# Patient Record
Sex: FEMALE | Race: WHITE | URBAN - METROPOLITAN AREA
[De-identification: names, ages, dates, MRNs, and addresses within clinical notes are randomized per-mention and may not be internally consistent; named-entity substitution may affect disease eponyms.]

---
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
## 2025-03-18 ENCOUNTER — RX ONLY (RX ONLY)
Age: 20
End: 2025-03-18

## 2025-03-18 ENCOUNTER — APPOINTMENT (OUTPATIENT)
Dept: URBAN - METROPOLITAN AREA CLINIC 77 | Facility: CLINIC | Age: 20
Setting detail: DERMATOLOGY
End: 2025-03-18

## 2025-03-18 DIAGNOSIS — L70.0 ACNE VULGARIS: ICD-10-CM

## 2025-03-18 PROCEDURE — ? PRESCRIPTION MEDICATION MANAGEMENT

## 2025-03-18 PROCEDURE — ? COUNSELING

## 2025-03-18 PROCEDURE — 99214 OFFICE O/P EST MOD 30 MIN: CPT

## 2025-03-18 PROCEDURE — ? CHRONOLOGY OF PRESENT ILLNESS

## 2025-03-18 RX ORDER — TRIFAROTENE 50 UG/G
AS DIRECTED CREAM TOPICAL QHS
Qty: 45 | Refills: 2 | Status: ERX

## 2025-03-18 RX ORDER — DAPSONE 75 MG/G
AS DIRECTED GEL TOPICAL QAM
Qty: 90 | Refills: 5 | Status: ERX

## 2025-03-18 ASSESSMENT — LOCATION ZONE DERM: LOCATION ZONE: FACE

## 2025-03-18 ASSESSMENT — LOCATION SIMPLE DESCRIPTION DERM: LOCATION SIMPLE: LEFT CHEEK

## 2025-03-18 ASSESSMENT — LOCATION DETAILED DESCRIPTION DERM: LOCATION DETAILED: LEFT INFERIOR CENTRAL MALAR CHEEK

## 2025-03-18 NOTE — PROCEDURE: CHRONOLOGY OF PRESENT ILLNESS
Detail Level: Zone
Chronology Of Present Illness: -\\n8/10/23\\nFlaring up today. Patient notes stopping Tretinoin 0.05% cream due to irritation. Will prescribe Aczone 7.5% gel QAM, and Aklief.\\n\\n11/2/23\\nPatient has improved significantly since last visit. She has a few, scattered resolving papules and PIH. She is satisfied with current regimen - will continue. No refills needed at this time. Photos taken today. Will f/u in 2 months.\\n\\n2/8/24\\nPatient present clear on exam. Patient is satisfied with the results. Will send refills for the Finacea today. Patient has been instructed to follow up in 1 year, but to schedule appointment sooner if acne flares.\\n\\n3/18/25\\nPatient presents for follow up of acne. Patient reports normally having control of flares until college and now is flaring. Patient reports consistency varies with topical regimen. Advised patient to continue topical regimen and will refill medications. Patient to f/u in 4 months.

## 2025-03-18 NOTE — PROCEDURE: PRESCRIPTION MEDICATION MANAGEMENT
Plan: AM:\\n1. Cleanse \\n2. Finacea foam\\n3. Dapsone gel: Apply thin layer to face\\n4. Moisturizer with SPF\\n\\nPM:\\n1. Cleanse \\n2. Fineacea foam\\n3. Aklief 0.005 % topical cream: Apply pea sized amount to entire face 2-3 times a week at night, increasing as tolerated\\n4. Moisturizer \\n\\nPlease wait for medications to fully absorb before moving to the next step
Continue Regimen: -\\nAczone 7.5 % topical gel with pump: Apply pea sized amount to entire face once daily.\\Harsha
Render In Strict Bullet Format?: No
Detail Level: Zone

## 2025-07-31 ENCOUNTER — APPOINTMENT (OUTPATIENT)
Dept: URBAN - METROPOLITAN AREA CLINIC 77 | Facility: CLINIC | Age: 20
Setting detail: DERMATOLOGY
End: 2025-07-31

## 2025-07-31 DIAGNOSIS — L70.0 ACNE VULGARIS: ICD-10-CM

## 2025-07-31 PROCEDURE — ? PRESCRIPTION MEDICATION MANAGEMENT

## 2025-07-31 PROCEDURE — ? CHRONOLOGY OF PRESENT ILLNESS

## 2025-07-31 PROCEDURE — ?

## 2025-07-31 PROCEDURE — ? PHOTO-DOCUMENTATION

## 2025-07-31 PROCEDURE — ? COUNSELING

## 2025-07-31 ASSESSMENT — LOCATION ZONE DERM: LOCATION ZONE: FACE

## 2025-07-31 ASSESSMENT — LOCATION DETAILED DESCRIPTION DERM: LOCATION DETAILED: LEFT INFERIOR CENTRAL MALAR CHEEK

## 2025-07-31 ASSESSMENT — LOCATION SIMPLE DESCRIPTION DERM: LOCATION SIMPLE: LEFT CHEEK
